# Patient Record
Sex: MALE | Race: WHITE | NOT HISPANIC OR LATINO | URBAN - METROPOLITAN AREA
[De-identification: names, ages, dates, MRNs, and addresses within clinical notes are randomized per-mention and may not be internally consistent; named-entity substitution may affect disease eponyms.]

---

## 2017-04-01 ENCOUNTER — EMERGENCY (EMERGENCY)
Facility: HOSPITAL | Age: 27
LOS: 1 days | Discharge: HOME | End: 2017-04-01
Attending: EMERGENCY MEDICINE
Payer: MEDICAID

## 2017-04-01 VITALS
WEIGHT: 173.06 LBS | HEIGHT: 74 IN | TEMPERATURE: 97 F | DIASTOLIC BLOOD PRESSURE: 62 MMHG | HEART RATE: 77 BPM | OXYGEN SATURATION: 98 % | RESPIRATION RATE: 16 BRPM | SYSTOLIC BLOOD PRESSURE: 115 MMHG

## 2017-04-01 VITALS
HEART RATE: 74 BPM | RESPIRATION RATE: 16 BRPM | OXYGEN SATURATION: 98 % | DIASTOLIC BLOOD PRESSURE: 74 MMHG | SYSTOLIC BLOOD PRESSURE: 120 MMHG | TEMPERATURE: 98 F

## 2017-04-01 DIAGNOSIS — R51 HEADACHE: ICD-10-CM

## 2017-04-01 DIAGNOSIS — R11.0 NAUSEA: ICD-10-CM

## 2017-04-01 PROCEDURE — 99284 EMERGENCY DEPT VISIT MOD MDM: CPT | Mod: 25

## 2017-04-01 PROCEDURE — 99284 EMERGENCY DEPT VISIT MOD MDM: CPT

## 2017-04-01 PROCEDURE — 96375 TX/PRO/DX INJ NEW DRUG ADDON: CPT

## 2017-04-01 PROCEDURE — 96374 THER/PROPH/DIAG INJ IV PUSH: CPT

## 2017-04-01 RX ORDER — METOCLOPRAMIDE HCL 10 MG
10 TABLET ORAL ONCE
Qty: 0 | Refills: 0 | Status: COMPLETED | OUTPATIENT
Start: 2017-04-01 | End: 2017-04-01

## 2017-04-01 RX ORDER — KETOROLAC TROMETHAMINE 30 MG/ML
30 SYRINGE (ML) INJECTION ONCE
Qty: 0 | Refills: 0 | Status: DISCONTINUED | OUTPATIENT
Start: 2017-04-01 | End: 2017-04-01

## 2017-04-01 RX ADMIN — Medication 30 MILLIGRAM(S): at 12:22

## 2017-04-01 RX ADMIN — Medication 10 MILLIGRAM(S): at 12:00

## 2017-04-01 RX ADMIN — Medication 30 MILLIGRAM(S): at 12:00

## 2017-04-01 NOTE — ED PROVIDER NOTE - MEDICAL DECISION MAKING DETAILS
Pt w/ report of headache off and on x4 days. No red flags. Normal neuro exam. Feels better w/ Reglan and Toradol. No indication for emergent intervention at this time. Pt reports cutting back on caffeine which may be culprit. Counseled on precautions. Pt w/ report of headache off and on x4 days. No red flags. Normal neuro exam. Feels better w/ Reglan and Toradol. No indication for emergent imaging at this time. Pt reports cutting back on caffeine which may be culprit. Counseled on precautions.

## 2017-04-01 NOTE — ED PROVIDER NOTE - NS ED MD SCRIBE ATTENDING SCRIBE SECTIONS
HISTORY OF PRESENT ILLNESS/REVIEW OF SYSTEMS/PHYSICAL EXAM/PAST MEDICAL/SURGICAL/SOCIAL HISTORY/VITAL SIGNS( Pullset)/HIV/DISPOSITION

## 2017-04-01 NOTE — ED ADULT NURSE NOTE - OBJECTIVE STATEMENT
c/o headache and nausea feels like weird
per family patient had unsteady gait tod ay and fell. denies pain at this time. vss. laceration noted at back of head. . ekg done. patient and family made comfortable.

## 2017-04-01 NOTE — ED PROVIDER NOTE - OBJECTIVE STATEMENT
27 y/o M pt w/ no significant PMHx presents to the ED c/o headache. Pt states that for the past few nights he's been waking up with his jaw clenched. For the past 3-4 days, pt has been feeling nauseous and headache. Notes that he has had migraines in the past; symptoms feel different from prior migraines. Pain is intermittent and tension like. Denies vomiting, tearing, fever, chills or any other complaints. NKDA.

## 2017-07-08 ENCOUNTER — EMERGENCY (EMERGENCY)
Facility: HOSPITAL | Age: 27
LOS: 1 days | Discharge: HOME | End: 2017-07-08
Attending: EMERGENCY MEDICINE
Payer: MEDICAID

## 2017-07-08 VITALS
DIASTOLIC BLOOD PRESSURE: 66 MMHG | HEART RATE: 77 BPM | RESPIRATION RATE: 16 BRPM | WEIGHT: 175.05 LBS | SYSTOLIC BLOOD PRESSURE: 117 MMHG | OXYGEN SATURATION: 100 % | TEMPERATURE: 97 F | HEIGHT: 75 IN

## 2017-07-08 DIAGNOSIS — H66.92 OTITIS MEDIA, UNSPECIFIED, LEFT EAR: ICD-10-CM

## 2017-07-08 DIAGNOSIS — R07.0 PAIN IN THROAT: ICD-10-CM

## 2017-07-08 PROCEDURE — 99284 EMERGENCY DEPT VISIT MOD MDM: CPT | Mod: 25

## 2017-07-08 PROCEDURE — 99283 EMERGENCY DEPT VISIT LOW MDM: CPT

## 2017-07-08 RX ORDER — ACETAMINOPHEN 500 MG
975 TABLET ORAL ONCE
Qty: 0 | Refills: 0 | Status: COMPLETED | OUTPATIENT
Start: 2017-07-08 | End: 2017-07-08

## 2017-07-08 RX ORDER — IBUPROFEN 200 MG
1 TABLET ORAL
Qty: 30 | Refills: 0 | OUTPATIENT
Start: 2017-07-08 | End: 2017-07-18

## 2017-07-08 RX ORDER — AMOXICILLIN 250 MG/5ML
1000 SUSPENSION, RECONSTITUTED, ORAL (ML) ORAL ONCE
Qty: 0 | Refills: 0 | Status: COMPLETED | OUTPATIENT
Start: 2017-07-08 | End: 2017-07-08

## 2017-07-08 RX ORDER — AMOXICILLIN 250 MG/5ML
1 SUSPENSION, RECONSTITUTED, ORAL (ML) ORAL
Qty: 20 | Refills: 0 | OUTPATIENT
Start: 2017-07-08 | End: 2017-07-18

## 2017-07-08 RX ORDER — IBUPROFEN 200 MG
200 TABLET ORAL ONCE
Qty: 0 | Refills: 0 | Status: COMPLETED | OUTPATIENT
Start: 2017-07-08 | End: 2017-07-08

## 2017-07-08 RX ADMIN — Medication 200 MILLIGRAM(S): at 08:47

## 2017-07-08 RX ADMIN — Medication 1000 MILLIGRAM(S): at 08:48

## 2017-07-08 RX ADMIN — Medication 975 MILLIGRAM(S): at 08:47

## 2017-07-08 NOTE — ED PROVIDER NOTE - OBJECTIVE STATEMENT
25yo M w mild asthma in the ER for L ear pain after [place] the beach 2 [place]. Subjective fevers, diffuse bilateral eye redness, throat pain. Decreased hearing to L ear, pos tinnitus. No other stx. Took 400 mg Advil 2 hrs PTA

## 2017-07-08 NOTE — ED PROVIDER NOTE - ENMT, MLM
Airway patent, Nasal mucosa clear. Mouth with normal mucosa. Throat has no vesicles, no oropharyngeal exudates and uvula is midline.  L ear erythema w mild effusion, decreased light reflex

## 2018-04-02 ENCOUNTER — EMERGENCY (EMERGENCY)
Facility: HOSPITAL | Age: 28
LOS: 1 days | Discharge: HOME | End: 2018-04-02
Attending: EMERGENCY MEDICINE
Payer: SELF-PAY

## 2018-04-02 VITALS
WEIGHT: 175.05 LBS | DIASTOLIC BLOOD PRESSURE: 66 MMHG | HEIGHT: 74 IN | TEMPERATURE: 100 F | SYSTOLIC BLOOD PRESSURE: 108 MMHG | RESPIRATION RATE: 19 BRPM | OXYGEN SATURATION: 100 % | HEART RATE: 97 BPM

## 2018-04-02 VITALS
SYSTOLIC BLOOD PRESSURE: 103 MMHG | RESPIRATION RATE: 20 BRPM | TEMPERATURE: 98 F | OXYGEN SATURATION: 98 % | DIASTOLIC BLOOD PRESSURE: 47 MMHG | HEART RATE: 94 BPM

## 2018-04-02 LAB
ALBUMIN SERPL ELPH-MCNC: 4.4 G/DL — SIGNIFICANT CHANGE UP (ref 3.5–5)
ALP SERPL-CCNC: 71 U/L — SIGNIFICANT CHANGE UP (ref 40–120)
ALT FLD-CCNC: 57 U/L DA — SIGNIFICANT CHANGE UP (ref 10–60)
ANION GAP SERPL CALC-SCNC: 8 MMOL/L — SIGNIFICANT CHANGE UP (ref 5–17)
AST SERPL-CCNC: 33 U/L — SIGNIFICANT CHANGE UP (ref 10–40)
BILIRUB SERPL-MCNC: 1 MG/DL — SIGNIFICANT CHANGE UP (ref 0.2–1.2)
BUN SERPL-MCNC: 19 MG/DL — HIGH (ref 7–18)
CALCIUM SERPL-MCNC: 9.7 MG/DL — SIGNIFICANT CHANGE UP (ref 8.4–10.5)
CHLORIDE SERPL-SCNC: 105 MMOL/L — SIGNIFICANT CHANGE UP (ref 96–108)
CO2 SERPL-SCNC: 26 MMOL/L — SIGNIFICANT CHANGE UP (ref 22–31)
CREAT SERPL-MCNC: 1.11 MG/DL — SIGNIFICANT CHANGE UP (ref 0.5–1.3)
EOSINOPHIL NFR BLD AUTO: 3 % — SIGNIFICANT CHANGE UP (ref 0–6)
GLUCOSE SERPL-MCNC: 116 MG/DL — HIGH (ref 70–99)
HCT VFR BLD CALC: 54.6 % — HIGH (ref 39–50)
HGB BLD-MCNC: 18.5 G/DL — HIGH (ref 13–17)
LIDOCAIN IGE QN: 106 U/L — SIGNIFICANT CHANGE UP (ref 73–393)
LYMPHOCYTES # BLD AUTO: 7 % — LOW (ref 13–44)
MCHC RBC-ENTMCNC: 31.9 PG — SIGNIFICANT CHANGE UP (ref 27–34)
MCHC RBC-ENTMCNC: 33.8 GM/DL — SIGNIFICANT CHANGE UP (ref 32–36)
MCV RBC AUTO: 94.4 FL — SIGNIFICANT CHANGE UP (ref 80–100)
MONOCYTES NFR BLD AUTO: 12 % — SIGNIFICANT CHANGE UP (ref 2–14)
NEUTROPHILS NFR BLD AUTO: 78 % — HIGH (ref 43–77)
PLATELET # BLD AUTO: 180 K/UL — SIGNIFICANT CHANGE UP (ref 150–400)
POTASSIUM SERPL-MCNC: 4.4 MMOL/L — SIGNIFICANT CHANGE UP (ref 3.5–5.3)
POTASSIUM SERPL-SCNC: 4.4 MMOL/L — SIGNIFICANT CHANGE UP (ref 3.5–5.3)
PROT SERPL-MCNC: 7.5 G/DL — SIGNIFICANT CHANGE UP (ref 6–8.3)
RBC # BLD: 5.79 M/UL — SIGNIFICANT CHANGE UP (ref 4.2–5.8)
RBC # FLD: 10.9 % — SIGNIFICANT CHANGE UP (ref 10.3–14.5)
SODIUM SERPL-SCNC: 139 MMOL/L — SIGNIFICANT CHANGE UP (ref 135–145)
WBC # BLD: 13.8 K/UL — HIGH (ref 3.8–10.5)
WBC # FLD AUTO: 13.8 K/UL — HIGH (ref 3.8–10.5)

## 2018-04-02 PROCEDURE — 87177 OVA AND PARASITES SMEARS: CPT

## 2018-04-02 PROCEDURE — 96374 THER/PROPH/DIAG INJ IV PUSH: CPT

## 2018-04-02 PROCEDURE — 85027 COMPLETE CBC AUTOMATED: CPT

## 2018-04-02 PROCEDURE — 80053 COMPREHEN METABOLIC PANEL: CPT

## 2018-04-02 PROCEDURE — 87046 STOOL CULTR AEROBIC BACT EA: CPT

## 2018-04-02 PROCEDURE — 87045 FECES CULTURE AEROBIC BACT: CPT

## 2018-04-02 PROCEDURE — 99284 EMERGENCY DEPT VISIT MOD MDM: CPT | Mod: 25

## 2018-04-02 PROCEDURE — 83690 ASSAY OF LIPASE: CPT

## 2018-04-02 RX ORDER — PANTOPRAZOLE SODIUM 20 MG/1
40 TABLET, DELAYED RELEASE ORAL ONCE
Qty: 0 | Refills: 0 | Status: COMPLETED | OUTPATIENT
Start: 2018-04-02 | End: 2018-04-02

## 2018-04-02 RX ORDER — CIPROFLOXACIN LACTATE 400MG/40ML
500 VIAL (ML) INTRAVENOUS ONCE
Qty: 0 | Refills: 0 | Status: COMPLETED | OUTPATIENT
Start: 2018-04-02 | End: 2018-04-02

## 2018-04-02 RX ORDER — ONDANSETRON 8 MG/1
4 TABLET, FILM COATED ORAL ONCE
Qty: 0 | Refills: 0 | Status: COMPLETED | OUTPATIENT
Start: 2018-04-02 | End: 2018-04-02

## 2018-04-02 RX ORDER — SODIUM CHLORIDE 9 MG/ML
3 INJECTION INTRAMUSCULAR; INTRAVENOUS; SUBCUTANEOUS ONCE
Qty: 0 | Refills: 0 | Status: COMPLETED | OUTPATIENT
Start: 2018-04-02 | End: 2018-04-02

## 2018-04-02 RX ORDER — MOXIFLOXACIN HYDROCHLORIDE TABLETS, 400 MG 400 MG/1
1 TABLET, FILM COATED ORAL
Qty: 6 | Refills: 0 | OUTPATIENT
Start: 2018-04-02 | End: 2018-04-04

## 2018-04-02 RX ORDER — SODIUM CHLORIDE 9 MG/ML
2000 INJECTION INTRAMUSCULAR; INTRAVENOUS; SUBCUTANEOUS ONCE
Qty: 0 | Refills: 0 | Status: COMPLETED | OUTPATIENT
Start: 2018-04-02 | End: 2018-04-02

## 2018-04-02 RX ORDER — ONDANSETRON 8 MG/1
1 TABLET, FILM COATED ORAL
Qty: 15 | Refills: 0 | OUTPATIENT
Start: 2018-04-02 | End: 2018-04-06

## 2018-04-02 RX ORDER — SODIUM CHLORIDE 9 MG/ML
1000 INJECTION INTRAMUSCULAR; INTRAVENOUS; SUBCUTANEOUS ONCE
Qty: 0 | Refills: 0 | Status: COMPLETED | OUTPATIENT
Start: 2018-04-02 | End: 2018-04-02

## 2018-04-02 RX ADMIN — PANTOPRAZOLE SODIUM 40 MILLIGRAM(S): 20 TABLET, DELAYED RELEASE ORAL at 03:04

## 2018-04-02 RX ADMIN — SODIUM CHLORIDE 3 MILLILITER(S): 9 INJECTION INTRAMUSCULAR; INTRAVENOUS; SUBCUTANEOUS at 03:04

## 2018-04-02 RX ADMIN — SODIUM CHLORIDE 2000 MILLILITER(S): 9 INJECTION INTRAMUSCULAR; INTRAVENOUS; SUBCUTANEOUS at 03:07

## 2018-04-02 RX ADMIN — SODIUM CHLORIDE 1000 MILLILITER(S): 9 INJECTION INTRAMUSCULAR; INTRAVENOUS; SUBCUTANEOUS at 03:02

## 2018-04-02 RX ADMIN — Medication 500 MILLIGRAM(S): at 07:22

## 2018-04-02 NOTE — ED ADULT NURSE NOTE - ED STAT RN HANDOFF DETAILS
Patient was endorsed to JEFF Castañeda in stable condition , improvement verbalized. Pending disposition.

## 2018-04-02 NOTE — ED PROVIDER NOTE - NEUROLOGICAL, MLM
Bed: 007A  Expected date: 11/21/17  Expected time:   Means of arrival: [facility]  Comments:
Son, Ed and primrose contacted and updated on completed care and plan for discharge.      James Allen RN  11/21/17 2047
TABs unit placed on pt for safety. SR up x2, call light in reach. Lights down so pt can rest. Curtain open for better visualization.       Keren Travis, MARCELLUS  11/21/17 7129
Ultrasound at bedside, Pt was repositioned and made more comfortable.       Edmund Renee RN  11/21/17 0869
Alert and oriented, no focal deficits, no motor or sensory deficits.

## 2018-04-02 NOTE — ED PROVIDER NOTE - PROGRESS NOTE DETAILS
Poor
labs show wbc 13K, H/H 18/54-likely hemoconcentration from dehydration-given 3L NS  cmp unremarkable, given cipro for likely travelers diarrhea  on reeval patient well appearing, tolerated po without vomiting. Patient stable for discharge

## 2018-04-02 NOTE — ED PROVIDER NOTE - MEDICAL DECISION MAKING DETAILS
Pt p/w vomiting and diarrhea 2 days after returning from [place]. Will obtain labs, stool studies, given IV fluids and Zofran, will reassess.

## 2018-04-02 NOTE — ED PROVIDER NOTE - OBJECTIVE STATEMENT
26 y/o M pt w/ no significant PMHx, presents to ED c/o vomiting, diarrhea and generalized weakness since yesterday s/p [facility] [place] [facility]. Pt reports persistent vomiting and numerous episodes of diarrhea, non-bloody. Felt weak today and thus decided to come in for evaluation. Pt denies fever or any sick contacts. NKDA.

## 2018-04-03 LAB
CULTURE RESULTS: SIGNIFICANT CHANGE UP
SPECIMEN SOURCE: SIGNIFICANT CHANGE UP

## 2018-04-04 LAB
CULTURE RESULTS: SIGNIFICANT CHANGE UP
SPECIMEN SOURCE: SIGNIFICANT CHANGE UP

## 2018-11-29 NOTE — ED PROVIDER NOTE - CONDUCTED A DETAILED DISCUSSION WITH PATIENT AND/OR GUARDIAN REGARDING, MDM
decreased strength/decreased flexibility return to ED if symptoms worsen, persist or questions arise/need for outpatient follow-up

## 2021-01-28 NOTE — ED ADULT NURSE NOTE - CAS TRG GEN SKIN COLOR
Labs obtained and patient updated on plan of care, resting comfortably on stretcher.    Normal for race

## 2022-08-28 ENCOUNTER — EMERGENCY (EMERGENCY)
Facility: HOSPITAL | Age: 32
LOS: 1 days | Discharge: HOME | End: 2022-08-28
Attending: EMERGENCY MEDICINE
Payer: SELF-PAY

## 2022-08-28 VITALS
WEIGHT: 154.98 LBS | RESPIRATION RATE: 18 BRPM | DIASTOLIC BLOOD PRESSURE: 76 MMHG | SYSTOLIC BLOOD PRESSURE: 122 MMHG | OXYGEN SATURATION: 100 % | HEIGHT: 74 IN | TEMPERATURE: 98 F | HEART RATE: 65 BPM

## 2022-08-28 PROCEDURE — 99283 EMERGENCY DEPT VISIT LOW MDM: CPT

## 2022-08-28 NOTE — ED PROVIDER NOTE - PHYSICAL EXAMINATION
Bilateral pubic areas with red patches; Bilateral dorsal aspect of the wrist with the red patches; Bilateral sub orbital area with small macule;  exam chaperone by Dr. Colin, Mild foreskin edema, Foreskin can be rolled back completely; No erythema, skin erosion and induration discharge; No inguina lymphadenopathy. Bilateral AC areas with red patches; Bilateral dorsal aspect of the wrist with the red patches; Bilateral sub orbital area with small macule;  exam chaperone by Dr. Colin, Mild foreskin edema, Foreskin can be rolled back completely; No erythema, skin erosion and induration discharge; No inguina lymphadenopathy.

## 2022-08-28 NOTE — ED PROVIDER NOTE - SKIN RASH DESCRIPTION
Bilateral pubic areas and dorsal aspect of the wrist with the red patches, bilateral sub orbital area with small macuel./PATCHY AREAS

## 2022-08-28 NOTE — ED PROVIDER NOTE - CROS ED ROS STATEMENT
all other ROS negative except as per HPI
Benign prostatic hyperplasia, presence of lower urinary tract symptoms unspecified, unspecified morphology    Squamous cell cancer of hypopharynx

## 2022-08-28 NOTE — ED PROVIDER NOTE - NSICDXPASTMEDICALHX_GEN_ALL_CORE_FT
PAST MEDICAL HISTORY:  Headache severe migraine headache/sinus pain/h/o nasal polyps surgery      Eczema

## 2022-08-28 NOTE — ED PROVIDER NOTE - OBJECTIVE STATEMENT
31 year old male with history of eczema, here for evaluation for rash, gradual onset of itchy rash both elbow, back of knees and wrists for 5 days and swelling of foreskin. Patient went to his dermatologist 1 week ago and told that his rash around his arms and legs are related to eczema, and recommended to apply regular cream. Patient was also seen by his family doctor 5 days ago, and gave anti fungal cream for the foreskin rash.  Patient went to Urgent Care 2 days ago, and got oral anti fungal for 2 weeks. Patient feels that genitals rash is improving. Patient denies fevers, joint pain, swelling of lymph node, dysuria, and penial discharge. NKDA. 31 year old male with history of eczema, here for evaluation for rash, gradual onset of itchy rash both elbow creases, back of knees and wrists for ~ 1 month and swelling/itching of foreskin x 5 days. Patient went to his dermatologist 1 week ago and told that his rash around his arms and legs are related to eczema, and recommended to apply regular cream. Patient was also seen by his family doctor 5 days ago, and gave anti fungal cream for the foreskin rash.  Patient went to Urgent Care 2 days ago, and got oral anti fungal Rx for 2 weeks. Patient feels that genitals rash is improving. Rash everywhere else is not improving. Patient denies fevers, joint pain, swelling of lymph node, dysuria, and penile discharge. NKDA.

## 2022-08-28 NOTE — ED ADULT NURSE NOTE - OBJECTIVE STATEMENT
c/o of pruritic rash to bilateral elbow creases, back of knee, and wrist x 1  month. Seen by derm and PCP. came here for further evaluation

## 2022-08-28 NOTE — ED PROVIDER NOTE - PATIENT PORTAL LINK FT
You can access the FollowMyHealth Patient Portal offered by [facility] by registering at the following website: [email or web address]. By joining [employer]’s FollowMyHealth portal, you will also be able to view your health information using other applications (apps) compatible with our system.

## 2022-08-28 NOTE — ED PROVIDER NOTE - CLINICAL SUMMARY MEDICAL DECISION MAKING FREE TEXT BOX
31 year old male presents with non resolving skin rash to the body with partial resolved genital rash, no sign of bacterial infection and antecubital wrist rash, likely suggest dermatitis, recommend Cortizone 10 cream to penal area and prednisone RX, follow up with dermatologist after 5 to 7 days. 31 year old male presents with non resolving skin rash to the body with partial resolved genital rash/itching. No signs of bacterial infection. Body rash, likely suggest dermatitis. Plan: Recommend Cortizone 10 cream to penile area and prednisone RX, follow up with dermatologist after 5 to 7 days.

## 2022-08-28 NOTE — ED PROVIDER NOTE - NS ED ATTENDING STATEMENT MOD
This was a shared visit with the NELSON. I reviewed and verified the documentation and independently performed the documented:

## 2022-08-28 NOTE — ED ADULT TRIAGE NOTE - CHIEF COMPLAINT QUOTE
c/o I have a itchy rash and my foreskin is swollen. Prescribed antifungal cream 2 days ago with no improvement in symptoms

## 2022-08-28 NOTE — ED PROVIDER NOTE - ATTENDING APP SHARED VISIT CONTRIBUTION OF CARE
I conducted a face-to-face interaction with patient and I agree with NP documentation and plan.  Pt presents with itchy rash x 1 month  Exam: scattered erythematous patches, no vesicles/pustules, no MM involvement  A/P:  Prescribed steroids and dermatology f/u

## 2022-10-04 NOTE — ED PROVIDER NOTE - PRO INTERPRETER NEED 2
Pt to receive injection of Retacrit in patient prior to discharge, care coordinated with hospitalist and RN. Pt will resume [facility] appts 10/11/22. Plan of care to be reviewed with patient with inpatient team    English

## 2022-12-21 NOTE — ED ADULT NURSE NOTE - CAS EDN DISCHARGE ASSESSMENT
Pt would like a call from BASIM denise regarding CT and Echo results. Please contact her at [phone]   Alert and oriented to person, place and time

## 2022-12-28 ENCOUNTER — EMERGENCY (EMERGENCY)
Facility: HOSPITAL | Age: 32
LOS: 1 days | Discharge: HOME | End: 2022-12-28
Attending: STUDENT IN AN ORGANIZED HEALTH CARE EDUCATION/TRAINING PROGRAM
Payer: SELF-PAY

## 2022-12-28 VITALS
OXYGEN SATURATION: 100 % | SYSTOLIC BLOOD PRESSURE: 122 MMHG | WEIGHT: 164.91 LBS | HEART RATE: 72 BPM | DIASTOLIC BLOOD PRESSURE: 81 MMHG | HEIGHT: 74 IN | TEMPERATURE: 98 F | RESPIRATION RATE: 18 BRPM

## 2022-12-28 PROBLEM — L30.9 DERMATITIS, UNSPECIFIED: Chronic | Status: ACTIVE | Noted: 2022-08-28

## 2022-12-28 PROCEDURE — 93971 EXTREMITY STUDY: CPT

## 2022-12-28 PROCEDURE — 93971 EXTREMITY STUDY: CPT | Mod: 26,RT

## 2022-12-28 PROCEDURE — 99284 EMERGENCY DEPT VISIT MOD MDM: CPT | Mod: 25

## 2022-12-28 PROCEDURE — 99284 EMERGENCY DEPT VISIT MOD MDM: CPT

## 2022-12-28 NOTE — ED PROVIDER NOTE - NSICDXPASTMEDICALHX_GEN_ALL_CORE_FT
PAST MEDICAL HISTORY:  Eczema     Headache severe migraine headache/sinus pain/h/o nasal polyps surgery

## 2022-12-28 NOTE — ED PROVIDER NOTE - NSICDXPASTSURGICALHX_GEN_ALL_CORE_FT
PAST SURGICAL HISTORY:  No significant past surgical history      Colchicine Pregnancy And Lactation Text: This medication is Pregnancy Category C and isn't considered safe during pregnancy. It is excreted in breast milk.

## 2022-12-28 NOTE — ED PROVIDER NOTE - OBJECTIVE STATEMENT
32 year old male with no pertinent PMH presents with right arm discomfort x 2-3 days. Pt states he had outpatient endoscopy 1 week ago with IV placed in RUE, reports gradual onset RUE discomfort and swelling x 2-3 days. Pt reports exercising at the gym over the past 2-3 days, denies any preceding injury or trauma. Denies any fevers, numbness, weakness, chest pain, shortness of breath, or rash. Denies any additional complaints.

## 2022-12-28 NOTE — ED PROVIDER NOTE - NSFOLLOWUPINSTRUCTIONS_ED_ALL_ED_FT
Rest and stay well hydrated.    Take over the counter acetaminophen (Tylenol) 650-1000 mg every 4-6 hours as needed for pain. Do not take more than 3000 mg in a 24 hour period. Be aware many over the counter and prescription medications also contain acetaminophen (Tylenol).     Take over the counter ibuprofen 400-600 mg every 6 hours with food as needed for pain.  Do not take these medications if you do not have pain or if you have any history of bleeding disorder or, kidney disease. Do not use ibuprofen if you are on blood thinners (anti-coagulation).     Follow up with your primary care physician in 1-3 days.    Return with any new or worsening symptoms including severe pain, numbness, weakness, inability to move fingers/hand, fevers, or any additional concerns.

## 2022-12-28 NOTE — ED PROVIDER NOTE - PROGRESS NOTE DETAILS
Sagar: pt seen and re-evaluated at bedside.  Pt comfortable in NAD. Discussed US results with pt including supportive care, PMD follow up, return precautions, pt understood and agreeable with plan.

## 2022-12-28 NOTE — ED ADULT NURSE NOTE - CHIEF COMPLAINT QUOTE
c/o pain , mild swelling Rt arm noticed last night . reports had an IV on Rt arm for Endoscopy last week

## 2022-12-28 NOTE — ED PROVIDER NOTE - CLINICAL SUMMARY MEDICAL DECISION MAKING FREE TEXT BOX
Noble-DO: 32 year old male with no pertinent PMH presents with right arm discomfort x 2-3 days. Pt states he had outpatient endoscopy 1 week ago with IV placed in RUE, reports gradual onset RUE discomfort and swelling x 2-3 days. Pt reports exercising at the gym over the past 2-3 days, denies any preceding injury or trauma. Denies any fevers, numbness, weakness, chest pain, shortness of breath, or rash.  RUE with no erythema, fluctuance, or induration. Radial pulse 2+. Sensation grossly intact. 5/5 strength throughout RUE. Extremity warm, dry, well perfused. Compartments soft. No bony tenderness. Unclear etiology, will obtain US r/o DVT r/o superficial thrombophlebitis with dispo pending workup.

## 2022-12-28 NOTE — ED PROVIDER NOTE - PATIENT PORTAL LINK FT
You can access the FollowMyHealth Patient Portal offered by [facility] by registering at the following website: [email or web address]. By joining [employer]’s FollowMyHealth portal, you will also be able to view your health information using other applications (apps) compatible with our system.

## 2022-12-28 NOTE — ED PROVIDER NOTE - PHYSICAL EXAMINATION
CONSTITUTIONAL: non-toxic, well appearing  SKIN: no rash, no petechiae.  EYES: pink conjunctiva, anicteric  NECK: Supple; FROM  CARD: extremities warm, dry, well perfused  RESP: no respiratory distress  ABD: non-tender  EXT: No edema. RUE: no erythema, fluctuance, or induration. Radial pulse 2+. Sensation grossly intact. 5/5 strength throughout RUE. Extremity warm, dry, well perfused. Compartments soft. No bony tenderness.   NEURO: Alert, oriented.  PSYCH: Cooperative, appropriate.